# Patient Record
Sex: FEMALE | Race: OTHER | NOT HISPANIC OR LATINO
[De-identification: names, ages, dates, MRNs, and addresses within clinical notes are randomized per-mention and may not be internally consistent; named-entity substitution may affect disease eponyms.]

---

## 2019-10-01 ENCOUNTER — APPOINTMENT (OUTPATIENT)
Dept: PULMONOLOGY | Facility: CLINIC | Age: 29
End: 2019-10-01
Payer: COMMERCIAL

## 2019-10-01 VITALS
OXYGEN SATURATION: 99 % | SYSTOLIC BLOOD PRESSURE: 104 MMHG | BODY MASS INDEX: 21.72 KG/M2 | WEIGHT: 140 LBS | DIASTOLIC BLOOD PRESSURE: 80 MMHG | HEART RATE: 81 BPM | TEMPERATURE: 98.2 F | HEIGHT: 67.5 IN

## 2019-10-01 DIAGNOSIS — Z87.891 PERSONAL HISTORY OF NICOTINE DEPENDENCE: ICD-10-CM

## 2019-10-01 DIAGNOSIS — Z87.01 PERSONAL HISTORY OF PNEUMONIA (RECURRENT): ICD-10-CM

## 2019-10-01 PROBLEM — Z00.00 ENCOUNTER FOR PREVENTIVE HEALTH EXAMINATION: Status: ACTIVE | Noted: 2019-10-01

## 2019-10-01 PROCEDURE — 94010 BREATHING CAPACITY TEST: CPT

## 2019-10-01 PROCEDURE — 99204 OFFICE O/P NEW MOD 45 MIN: CPT | Mod: 25

## 2019-10-01 PROCEDURE — 95012 NITRIC OXIDE EXP GAS DETER: CPT

## 2019-10-01 NOTE — PHYSICAL EXAM
[General Appearance - Well Developed] : well developed [General Appearance - Well Nourished] : well nourished [General Appearance - In No Acute Distress] : no acute distress [Normal Conjunctiva] : the conjunctiva exhibited no abnormalities [Normal Oropharynx] : normal oropharynx [Heart Rate And Rhythm] : heart rate and rhythm were normal [Heart Sounds] : normal S1 and S2 [Murmurs] : no murmurs present [Edema] : no peripheral edema present [Auscultation Breath Sounds / Voice Sounds] : lungs were clear to auscultation bilaterally [Bowel Sounds] : normal bowel sounds [Abdomen Soft] : soft [Nail Clubbing] : no clubbing of the fingernails [Cyanosis, Localized] : no localized cyanosis [] : no rash [Affect] : the affect was normal

## 2019-10-01 NOTE — ASSESSMENT
[FreeTextEntry1] : Data reviewed:\par \par Nathan 10/01/2019 : normal / FENO 37\par \par Impression:\par Stated hx of asthma, dyspnea, mildly elevated FENO, in pregnancy\par \par Plan:\par Her chest is clear today, spirometry demonstrates no obstruction, and FENO slightly above normal range, but not high enough to recommend increasing ICS. Would continue present dose of Pulmicort 180mcg bid, and albuterol prn. Given early pregnancy, plan to see her back in 4-6 weeks to monitor her asthma. Call sooner for any worsening.\par Recommend flu vaccine.

## 2019-10-01 NOTE — HISTORY OF PRESENT ILLNESS
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 History of asthma as a young person, only needed prn albuterol. This spring her symptoms flared - cough and dyspnea. Saw asthma/allergy, put on Symbicort and montelukast. Started to feel better and stopped taking all of this. Then started seeing a new asthma/allergy doc on [place], kept her on Symbicort and started allergy shots. Continues to off and no have a feeling of being unable to take a deep breath. In the past week has needed albuterol 3x w relief. Now on Pulmicort since pregnant: 180 bid. Some cough this week. Feels like her breath gets stuck in her throat, like something is blocking the breath. Lasts minutes to hours. No nocturnal sx. Describes herself as somewhat anxious and hypochondriacal. First pregnancy; OB confirmed.\par

## 2019-11-15 ENCOUNTER — APPOINTMENT (OUTPATIENT)
Dept: PULMONOLOGY | Facility: CLINIC | Age: 29
End: 2019-11-15
Payer: COMMERCIAL

## 2019-11-15 VITALS
WEIGHT: 139 LBS | HEART RATE: 87 BPM | OXYGEN SATURATION: 98 % | SYSTOLIC BLOOD PRESSURE: 106 MMHG | BODY MASS INDEX: 21.56 KG/M2 | HEIGHT: 67.5 IN | TEMPERATURE: 98.5 F | DIASTOLIC BLOOD PRESSURE: 70 MMHG

## 2019-11-15 PROCEDURE — 94010 BREATHING CAPACITY TEST: CPT

## 2019-11-15 PROCEDURE — 99213 OFFICE O/P EST LOW 20 MIN: CPT | Mod: 25

## 2019-11-15 PROCEDURE — 95012 NITRIC OXIDE EXP GAS DETER: CPT

## 2019-11-15 RX ORDER — BUDESONIDE 90 UG/1
90 AEROSOL, POWDER RESPIRATORY (INHALATION)
Qty: 1 | Refills: 3 | Status: DISCONTINUED | COMMUNITY
End: 2019-11-15

## 2019-11-15 NOTE — HISTORY OF PRESENT ILLNESS
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 History of asthma as a young person, only needed prn albuterol. This spring her symptoms flared - cough and dyspnea. Saw asthma/allergy, put on Symbicort and montelukast. Started to feel better and stopped taking all of this. Then started seeing a new asthma/allergy doc on [place], kept her on Symbicort and started allergy shots. Continues to off and no have a feeling of being unable to take a deep breath. In the past week has needed albuterol 3x w relief. Now on Pulmicort since pregnant: 180 bid. Some cough this week. Feels like her breath gets stuck in her throat, like something is blocking the breath. Lasts minutes to hours. No nocturnal sx. Describes herself as somewhat anxious and hypochondriacal. First pregnancy; OB confirmed.\par \par 11/15/19: Here w her mom again. Stopped using Pulmicort regularly 3 weeks or so ago, hasn't used it at all in a few days. No albuterol at all. 14 weeks, no problems with pregnancy. \par

## 2019-11-15 NOTE — ASSESSMENT
[FreeTextEntry1] : Data reviewed:\par \par Nathan 10/01/2019 : normal, FEV1 84% / FENO 37\par Bahama 11/15/19: normal, FEV1 86% / FENO 11\par \par Impression:\par Stated hx of asthma, now asymptomatic off meds, in pregnancy\par \par Plan:\par No evidence of any problem today. Asymptomatic w normal eval off meds.\par Would remain off Pulmicort. Low threshold to return for any new problems.\par States had flu vaccine.

## 2019-12-19 ENCOUNTER — CLINICAL ADVICE (OUTPATIENT)
Age: 29
End: 2019-12-19

## 2019-12-19 ENCOUNTER — APPOINTMENT (OUTPATIENT)
Dept: PULMONOLOGY | Facility: CLINIC | Age: 29
End: 2019-12-19
Payer: COMMERCIAL

## 2019-12-19 VITALS
SYSTOLIC BLOOD PRESSURE: 110 MMHG | WEIGHT: 142 LBS | HEIGHT: 67.5 IN | BODY MASS INDEX: 22.03 KG/M2 | TEMPERATURE: 98.4 F | OXYGEN SATURATION: 98 % | HEART RATE: 88 BPM | DIASTOLIC BLOOD PRESSURE: 68 MMHG

## 2019-12-19 DIAGNOSIS — J45.909 UNSPECIFIED ASTHMA, UNCOMPLICATED: ICD-10-CM

## 2019-12-19 PROCEDURE — 95012 NITRIC OXIDE EXP GAS DETER: CPT

## 2019-12-19 PROCEDURE — 94010 BREATHING CAPACITY TEST: CPT

## 2019-12-19 PROCEDURE — 99213 OFFICE O/P EST LOW 20 MIN: CPT | Mod: 25

## 2019-12-19 NOTE — ASSESSMENT
[FreeTextEntry1] : Data reviewed:\par \par Marlen 10/01/2019 : normal, FEV1 84% / FENO 37\par Wade 11/15/19: normal, FEV1 86% / FENO 11\par Wade 12/12/19: normal / FENO 35\par \par Impression:\par Stated hx of asthma,w intermittent symptoms, in pregnancy\par Anxiety\par \par Plan:\par No evidence of any problem. No stridor, clear chest, normal marlen.\par Could see ENT again - does she have paradoxical vocal cord motion?\par Would stay off inhalers.\par And should minimize self-doctoring. Talked about anxiety.\par

## 2019-12-19 NOTE — PHYSICAL EXAM
[General Appearance - Well Nourished] : well nourished [General Appearance - In No Acute Distress] : no acute distress [General Appearance - Well Developed] : well developed [Heart Rate And Rhythm] : heart rate and rhythm were normal [FreeTextEntry1] : no stridor [Heart Sounds] : normal S1 and S2 [Auscultation Breath Sounds / Voice Sounds] : lungs were clear to auscultation bilaterally [Murmurs] : no murmurs present

## 2019-12-19 NOTE — HISTORY OF PRESENT ILLNESS
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 History of asthma as a young person, only needed prn albuterol. This spring her symptoms flared - cough and dyspnea. Saw asthma/allergy, put on Symbicort and montelukast. Started to feel better and stopped taking all of this. Then started seeing a new asthma/allergy doc on [place], kept her on Symbicort and started allergy shots. Continues to off and no have a feeling of being unable to take a deep breath. In the past week has needed albuterol 3x w relief. Now on Pulmicort since pregnant: 180 bid. Some cough this week. Feels like her breath gets stuck in her throat, like something is blocking the breath. Lasts minutes to hours. No nocturnal sx. Describes herself as somewhat anxious and hypochondriacal. First pregnancy; OB confirmed.\par \par 11/15/19: Here w her mom again. Stopped using Pulmicort regularly 3 weeks or so ago, hasn't used it at all in a few days. No albuterol at all. 14 weeks, no problems with pregnancy. \par \par 12/19/19: Here w mom. Called today worried because she is hearing wheezing or stridor over her larynx, once after aspirating on water, and once after sneezing. Says one episode lasted 10-15 min. Remains very anxious, auscultates self at home, checks pulse ox. Remains off Pulmicort. Has seen ENT this pregnancy, told LPR, and does have heartburn sensation. Pregnancy otherwise ok.\par

## 2021-02-10 ENCOUNTER — APPOINTMENT (OUTPATIENT)
Dept: PEDIATRIC ALLERGY IMMUNOLOGY | Facility: CLINIC | Age: 31
End: 2021-02-10

## 2021-03-10 ENCOUNTER — APPOINTMENT (OUTPATIENT)
Dept: PEDIATRIC ALLERGY IMMUNOLOGY | Facility: CLINIC | Age: 31
End: 2021-03-10

## 2021-06-03 ENCOUNTER — APPOINTMENT (OUTPATIENT)
Dept: PULMONOLOGY | Facility: CLINIC | Age: 31
End: 2021-06-03